# Patient Record
Sex: FEMALE | Race: WHITE | NOT HISPANIC OR LATINO | Employment: STUDENT | URBAN - METROPOLITAN AREA
[De-identification: names, ages, dates, MRNs, and addresses within clinical notes are randomized per-mention and may not be internally consistent; named-entity substitution may affect disease eponyms.]

---

## 2023-08-25 ENCOUNTER — LAB (OUTPATIENT)
Dept: LAB | Facility: LAB | Age: 18
End: 2023-08-25
Payer: COMMERCIAL

## 2023-08-25 ENCOUNTER — OFFICE VISIT (OUTPATIENT)
Dept: PRIMARY CARE | Facility: CLINIC | Age: 18
End: 2023-08-25
Payer: COMMERCIAL

## 2023-08-25 DIAGNOSIS — Z00.00 HEALTH CARE MAINTENANCE: Primary | ICD-10-CM

## 2023-08-25 DIAGNOSIS — Z00.00 HEALTH CARE MAINTENANCE: ICD-10-CM

## 2023-08-25 LAB
ERYTHROCYTE DISTRIBUTION WIDTH (RATIO) BY AUTOMATED COUNT: 12 % (ref 11.5–14.5)
ERYTHROCYTE MEAN CORPUSCULAR HEMOGLOBIN CONCENTRATION (G/DL) BY AUTOMATED: 34.8 G/DL (ref 32–36)
ERYTHROCYTE MEAN CORPUSCULAR VOLUME (FL) BY AUTOMATED COUNT: 98 FL (ref 80–100)
ERYTHROCYTES (10*6/UL) IN BLOOD BY AUTOMATED COUNT: 4.52 X10E12/L (ref 4–5.2)
HEMATOCRIT (%) IN BLOOD BY AUTOMATED COUNT: 44.2 % (ref 36–46)
HEMOGLOBIN (G/DL) IN BLOOD: 15.4 G/DL (ref 12–16)
HEPATITIS B VIRUS SURFACE AB (MIU/ML) IN SERUM: <3.1 MIU/ML
LEUKOCYTES (10*3/UL) IN BLOOD BY AUTOMATED COUNT: 7.1 X10E9/L (ref 4.4–11.3)
NRBC (PER 100 WBCS) BY AUTOMATED COUNT: 0 /100 WBC (ref 0–0)
PLATELETS (10*3/UL) IN BLOOD AUTOMATED COUNT: 356 X10E9/L (ref 150–450)

## 2023-08-25 PROCEDURE — 86765 RUBEOLA ANTIBODY: CPT

## 2023-08-25 PROCEDURE — 86735 MUMPS ANTIBODY: CPT

## 2023-08-25 PROCEDURE — 81003 URINALYSIS AUTO W/O SCOPE: CPT

## 2023-08-25 PROCEDURE — 86481 TB AG RESPONSE T-CELL SUSP: CPT

## 2023-08-25 PROCEDURE — 86787 VARICELLA-ZOSTER ANTIBODY: CPT

## 2023-08-25 PROCEDURE — 90471 IMMUNIZATION ADMIN: CPT | Performed by: INTERNAL MEDICINE

## 2023-08-25 PROCEDURE — 90472 IMMUNIZATION ADMIN EACH ADD: CPT | Performed by: INTERNAL MEDICINE

## 2023-08-25 PROCEDURE — 90682 RIV4 VACC RECOMBINANT DNA IM: CPT | Performed by: INTERNAL MEDICINE

## 2023-08-25 PROCEDURE — 36415 COLL VENOUS BLD VENIPUNCTURE: CPT

## 2023-08-25 PROCEDURE — 99385 PREV VISIT NEW AGE 18-39: CPT | Performed by: INTERNAL MEDICINE

## 2023-08-25 PROCEDURE — 85027 COMPLETE CBC AUTOMATED: CPT

## 2023-08-25 PROCEDURE — 86706 HEP B SURFACE ANTIBODY: CPT

## 2023-08-25 PROCEDURE — 86762 RUBELLA ANTIBODY: CPT

## 2023-08-25 PROCEDURE — 90715 TDAP VACCINE 7 YRS/> IM: CPT | Performed by: INTERNAL MEDICINE

## 2023-08-25 RX ORDER — FLUOXETINE HYDROCHLORIDE 20 MG/1
CAPSULE ORAL
COMMUNITY

## 2023-08-25 RX ORDER — DESOGESTREL AND ETHINYL ESTRADIOL 0.15-0.03
KIT ORAL
COMMUNITY

## 2023-08-25 RX ORDER — TOPIRAMATE 100 MG/1
1 CAPSULE, EXTENDED RELEASE ORAL DAILY
COMMUNITY
Start: 2023-08-11 | End: 2023-11-09

## 2023-08-25 NOTE — PROGRESS NOTES
Subjective   Patient ID: Miroslava Bryant is a 17 y.o. female who presents for New Patient Visit.    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    Physical Exam  Vitals reviewed.   Constitutional:       Appearance: Normal appearance.   HENT:      Head: Normocephalic and atraumatic.      Right Ear: Tympanic membrane, ear canal and external ear normal.      Left Ear: Tympanic membrane, ear canal and external ear normal.      Nose: Nose normal.      Mouth/Throat:      Pharynx: Oropharynx is clear.   Eyes:      Extraocular Movements: Extraocular movements intact.      Conjunctiva/sclera: Conjunctivae normal.      Pupils: Pupils are equal, round, and reactive to light.   Cardiovascular:      Rate and Rhythm: Normal rate and regular rhythm.      Pulses: Normal pulses.      Heart sounds: Normal heart sounds.   Pulmonary:      Effort: Pulmonary effort is normal.      Breath sounds: Normal breath sounds.   Abdominal:      General: Abdomen is flat. Bowel sounds are normal.      Palpations: Abdomen is soft.   Musculoskeletal:      Cervical back: Normal range of motion and neck supple.   Skin:     General: Skin is warm and dry.   Neurological:      General: No focal deficit present.      Mental Status: She is alert and oriented to person, place, and time.   Psychiatric:         Mood and Affect: Mood normal.         Assessment/Plan   Problem List Items Addressed This Visit    None  Visit Diagnoses       Health care maintenance    -  Primary    Relevant Orders    Mumps Antibody, IgG    Rubella Antibody, IgG     Rubeola Antibody, IgG    Varicella Zoster Antibody, IgG    T-Spot TB    Hepatitis B Surface Antibody    CBC    Urinalysis with Reflex Microscopic    Flu vaccine, quadrivalent, recombinant, preservative free, adult (FLUBLOK) (Completed)        Physical normal  Work-up ordered for the immunization titers CBC urinalysis and TB test  Tdap and flu  shot

## 2023-08-26 LAB
APPEARANCE, URINE: NORMAL
BILIRUBIN, URINE: NEGATIVE
BLOOD, URINE: NEGATIVE
COLOR, URINE: YELLOW
GLUCOSE, URINE: NEGATIVE MG/DL
KETONES, URINE: NEGATIVE MG/DL
LEUKOCYTE ESTERASE, URINE: NEGATIVE
MUMPS IGG ANTIBODY: POSITIVE
NITRITE, URINE: NEGATIVE
PH, URINE: 6 (ref 5–8)
PROTEIN, URINE: NEGATIVE MG/DL
RUBELLA VIRUS IGG AB: POSITIVE
RUBEOLA IGG ANTIBODY: POSITIVE
SPECIFIC GRAVITY, URINE: 1.02 (ref 1–1.03)
UROBILINOGEN, URINE: <2 MG/DL (ref 0–1.9)
VARICELLA ZOSTER IGG: POSITIVE

## 2023-08-28 LAB
NIL(NEG) CONTROL SPOT COUNT: NORMAL
PANEL A SPOT COUNT: 0
PANEL B SPOT COUNT: 0
POS CONTROL SPOT COUNT: NORMAL
T-SPOT. TB INTERPRETATION: NEGATIVE

## 2023-08-29 ENCOUNTER — HOSPITAL ENCOUNTER (OUTPATIENT)
Dept: DATA CONVERSION | Facility: HOSPITAL | Age: 18
Discharge: HOME | End: 2023-08-29
Payer: COMMERCIAL

## 2023-08-29 DIAGNOSIS — N89.8 OTHER SPECIFIED NONINFLAMMATORY DISORDERS OF VAGINA: ICD-10-CM

## 2023-08-29 DIAGNOSIS — Z11.3 ENCOUNTER FOR SCREENING FOR INFECTIONS WITH A PREDOMINANTLY SEXUAL MODE OF TRANSMISSION: ICD-10-CM

## 2023-08-29 LAB
C TRACH RRNA SPEC QL NAA+PROBE: NORMAL
DRUG SCREEN COMMENT UR-IMP: NORMAL
N GONORRHOEA RRNA SPEC QL NAA+PROBE: NORMAL
SPECIMEN SOURCE: NORMAL

## 2023-08-31 DIAGNOSIS — Z23 ENCOUNTER FOR IMMUNIZATION: ICD-10-CM

## 2023-08-31 RX ORDER — VARICELLA VIRUS VACCINE LIVE 1350 [PFU]/.5ML
0.5 INJECTION, POWDER, LYOPHILIZED, FOR SUSPENSION SUBCUTANEOUS ONCE
Qty: 1 EACH | Refills: 0 | Status: SHIPPED | OUTPATIENT
Start: 2023-08-31 | End: 2023-08-31

## 2023-09-01 ENCOUNTER — APPOINTMENT (OUTPATIENT)
Dept: PRIMARY CARE | Facility: CLINIC | Age: 18
End: 2023-09-01
Payer: COMMERCIAL

## 2023-09-05 ENCOUNTER — LAB (OUTPATIENT)
Dept: LAB | Facility: LAB | Age: 18
End: 2023-09-05
Payer: COMMERCIAL

## 2023-09-05 DIAGNOSIS — Z23 ENCOUNTER FOR IMMUNIZATION: ICD-10-CM

## 2023-09-05 LAB — VARICELLA ZOSTER IGG: NORMAL

## 2023-09-05 PROCEDURE — 86787 VARICELLA-ZOSTER ANTIBODY: CPT

## 2023-09-05 PROCEDURE — 36415 COLL VENOUS BLD VENIPUNCTURE: CPT

## 2023-09-13 DIAGNOSIS — Z23 ENCOUNTER FOR IMMUNIZATION: ICD-10-CM

## 2023-09-16 VITALS
SYSTOLIC BLOOD PRESSURE: 112 MMHG | WEIGHT: 122.2 LBS | BODY MASS INDEX: 22.49 KG/M2 | DIASTOLIC BLOOD PRESSURE: 64 MMHG | HEIGHT: 62 IN

## 2023-10-16 ENCOUNTER — LAB (OUTPATIENT)
Dept: LAB | Facility: LAB | Age: 18
End: 2023-10-16
Payer: COMMERCIAL

## 2023-10-16 DIAGNOSIS — Z23 ENCOUNTER FOR IMMUNIZATION: ICD-10-CM

## 2023-10-16 LAB
VARICELLA ZOSTER IGG INDEX: 4.7 IA
VZV IGG SER QL IA: POSITIVE

## 2023-10-16 PROCEDURE — 36415 COLL VENOUS BLD VENIPUNCTURE: CPT

## 2023-10-16 PROCEDURE — 86787 VARICELLA-ZOSTER ANTIBODY: CPT

## 2024-02-14 ENCOUNTER — OFFICE VISIT (OUTPATIENT)
Dept: PRIMARY CARE | Facility: CLINIC | Age: 19
End: 2024-02-14
Payer: COMMERCIAL

## 2024-02-14 VITALS
DIASTOLIC BLOOD PRESSURE: 54 MMHG | BODY MASS INDEX: 21.62 KG/M2 | HEIGHT: 63 IN | WEIGHT: 122 LBS | SYSTOLIC BLOOD PRESSURE: 102 MMHG

## 2024-02-14 DIAGNOSIS — R35.0 URINARY FREQUENCY: ICD-10-CM

## 2024-02-14 DIAGNOSIS — M54.50 ACUTE MIDLINE LOW BACK PAIN WITHOUT SCIATICA: ICD-10-CM

## 2024-02-14 LAB
POC APPEARANCE, URINE: CLEAR
POC BILIRUBIN, URINE: NEGATIVE
POC BLOOD, URINE: NEGATIVE
POC COLOR, URINE: YELLOW
POC GLUCOSE, URINE: NEGATIVE MG/DL
POC KETONES, URINE: NEGATIVE MG/DL
POC LEUKOCYTES, URINE: NEGATIVE
POC NITRITE,URINE: NEGATIVE
POC PH, URINE: 6 PH
POC PROTEIN, URINE: NEGATIVE MG/DL
POC SPECIFIC GRAVITY, URINE: >=1.03
POC UROBILINOGEN, URINE: 0.2 EU/DL

## 2024-02-14 PROCEDURE — 99213 OFFICE O/P EST LOW 20 MIN: CPT | Performed by: INTERNAL MEDICINE

## 2024-02-14 PROCEDURE — 81003 URINALYSIS AUTO W/O SCOPE: CPT | Performed by: INTERNAL MEDICINE

## 2024-02-14 PROCEDURE — 1036F TOBACCO NON-USER: CPT | Performed by: INTERNAL MEDICINE

## 2024-02-14 RX ORDER — CYCLOBENZAPRINE HCL 10 MG
10 TABLET ORAL 3 TIMES DAILY PRN
Qty: 60 TABLET | Refills: 0 | Status: SHIPPED | OUTPATIENT
Start: 2024-02-14 | End: 2024-04-14

## 2024-02-14 RX ORDER — NABUMETONE 750 MG/1
750 TABLET, FILM COATED ORAL 2 TIMES DAILY
Qty: 60 TABLET | Refills: 0 | Status: SHIPPED | OUTPATIENT
Start: 2024-02-14 | End: 2025-02-13

## 2024-02-19 NOTE — PROGRESS NOTES
"Subjective   Patient ID: Miroslava Bryant is a 18 y.o. female who presents for lower back pain.    HPI   Patient presents with complaints of back pain she is complaining of pain across concerned that she is having UTI or kidney stone.  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   Constitutional:       Appearance: Normal appearance.   HENT:      Head: Normocephalic and atraumatic.      Right Ear: Tympanic membrane, ear canal and external ear normal.      Left Ear: Tympanic membrane, ear canal and external ear normal.      Nose: Nose normal.      Mouth/Throat:      Pharynx: Oropharynx is clear.   Eyes:      Extraocular Movements: Extraocular movements intact.      Conjunctiva/sclera: Conjunctivae normal.      Pupils: Pupils are equal, round, and reactive to light.   Cardiovascular:      Rate and Rhythm: Normal rate and regular rhythm.      Pulses: Normal pulses.      Heart sounds: Normal heart sounds.   Pulmonary:      Effort: Pulmonary effort is normal.      Breath sounds: Normal breath sounds.   Abdominal:      General: Abdomen is flat. Bowel sounds are normal.      Palpations: Abdomen is soft.   Musculoskeletal:         General: Tenderness present.      Cervical back: Normal range of motion and neck supple.      Comments: Tenderness in lumbar spine across   Skin:     General: Skin is warm and dry.   Neurological:      General: No focal deficit present.      Mental Status: " She is alert and oriented to person, place, and time.   Psychiatric:         Mood and Affect: Mood normal.         Assessment/Plan   Problem List Items Addressed This Visit    None  Visit Diagnoses       Urinary frequency        Relevant Orders    POCT UA Automated manually resulted (Completed)    Acute midline low back pain without sciatica        Relevant Medications    nabumetone (Relafen) 750 mg tablet    cyclobenzaprine (Flexeril) 10 mg tablet        Past recap   physical normal  Work-up ordered for the immunization titers CBC urinalysis and TB test  Tdap and flu  shot    14 2024  UA done  No UTI  No blood in the urine  Patient has pain on both sides and it is in the lower lumbar area  Most likely is muscular  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

## 2024-05-21 ENCOUNTER — TELEPHONE (OUTPATIENT)
Dept: OBSTETRICS AND GYNECOLOGY | Facility: CLINIC | Age: 19
End: 2024-05-21
Payer: COMMERCIAL

## 2024-05-21 DIAGNOSIS — B37.9 YEAST INFECTION: Primary | ICD-10-CM

## 2024-05-21 RX ORDER — FLUCONAZOLE 150 MG/1
150 TABLET ORAL ONCE
Qty: 1 TABLET | Refills: 0 | Status: SHIPPED | OUTPATIENT
Start: 2024-05-21 | End: 2024-05-21

## 2024-05-21 NOTE — TELEPHONE ENCOUNTER
Pt on amoxicillin for an ear infection and now with a yeast infection, mom calling and requesting diflucan.  Pt c/o itching and white discharge.  Mom states pharmacy having trouble retrieving voice mails, please escribe

## 2024-06-13 DIAGNOSIS — Z30.09 ENCOUNTER FOR OTHER GENERAL COUNSELING AND ADVICE ON CONTRACEPTION: ICD-10-CM

## 2024-06-13 RX ORDER — NORELGESTROMIN AND ETHINYL ESTRADIOL 150; 35 UG/D; UG/D
PATCH TRANSDERMAL
Qty: 9 PATCH | Refills: 3 | Status: SHIPPED | OUTPATIENT
Start: 2024-06-13

## 2024-07-01 ENCOUNTER — OFFICE VISIT (OUTPATIENT)
Dept: PRIMARY CARE | Facility: CLINIC | Age: 19
End: 2024-07-01
Payer: COMMERCIAL

## 2024-07-01 ENCOUNTER — LAB (OUTPATIENT)
Dept: LAB | Facility: LAB | Age: 19
End: 2024-07-01
Payer: COMMERCIAL

## 2024-07-01 DIAGNOSIS — R53.83 OTHER FATIGUE: ICD-10-CM

## 2024-07-01 DIAGNOSIS — T78.40XD ALLERGY, SUBSEQUENT ENCOUNTER: ICD-10-CM

## 2024-07-01 DIAGNOSIS — F41.9 ANXIETY: Primary | ICD-10-CM

## 2024-07-01 DIAGNOSIS — R07.9 CHEST PAIN, UNSPECIFIED TYPE: ICD-10-CM

## 2024-07-01 LAB
ALBUMIN SERPL BCP-MCNC: 4 G/DL (ref 3.4–5)
ALP SERPL-CCNC: 42 U/L (ref 33–110)
ALT SERPL W P-5'-P-CCNC: 13 U/L (ref 7–45)
ANION GAP SERPL CALC-SCNC: 14 MMOL/L (ref 10–20)
APPEARANCE UR: CLEAR
AST SERPL W P-5'-P-CCNC: 13 U/L (ref 9–39)
BILIRUB SERPL-MCNC: 0.2 MG/DL (ref 0–1.2)
BILIRUB UR STRIP.AUTO-MCNC: NEGATIVE MG/DL
BUN SERPL-MCNC: 11 MG/DL (ref 6–23)
CALCIUM SERPL-MCNC: 9.2 MG/DL (ref 8.6–10.6)
CHLORIDE SERPL-SCNC: 108 MMOL/L (ref 98–107)
CO2 SERPL-SCNC: 22 MMOL/L (ref 21–32)
COLOR UR: NORMAL
CREAT SERPL-MCNC: 0.65 MG/DL (ref 0.5–1.05)
EGFRCR SERPLBLD CKD-EPI 2021: >90 ML/MIN/1.73M*2
ERYTHROCYTE [DISTWIDTH] IN BLOOD BY AUTOMATED COUNT: 12.2 % (ref 11.5–14.5)
GLUCOSE SERPL-MCNC: 105 MG/DL (ref 74–99)
GLUCOSE UR STRIP.AUTO-MCNC: NORMAL MG/DL
HCT VFR BLD AUTO: 41.5 % (ref 36–46)
HGB BLD-MCNC: 14.1 G/DL (ref 12–16)
KETONES UR STRIP.AUTO-MCNC: NEGATIVE MG/DL
LEUKOCYTE ESTERASE UR QL STRIP.AUTO: NEGATIVE
MCH RBC QN AUTO: 32.3 PG (ref 26–34)
MCHC RBC AUTO-ENTMCNC: 34 G/DL (ref 32–36)
MCV RBC AUTO: 95 FL (ref 80–100)
NITRITE UR QL STRIP.AUTO: NEGATIVE
NRBC BLD-RTO: 0 /100 WBCS (ref 0–0)
PH UR STRIP.AUTO: 7 [PH]
PLATELET # BLD AUTO: 383 X10*3/UL (ref 150–450)
POTASSIUM SERPL-SCNC: 3.9 MMOL/L (ref 3.5–5.3)
PROT SERPL-MCNC: 6.9 G/DL (ref 6.4–8.2)
PROT UR STRIP.AUTO-MCNC: NEGATIVE MG/DL
RBC # BLD AUTO: 4.37 X10*6/UL (ref 4–5.2)
RBC # UR STRIP.AUTO: NEGATIVE /UL
SODIUM SERPL-SCNC: 140 MMOL/L (ref 136–145)
SP GR UR STRIP.AUTO: 1.02
T3FREE SERPL-MCNC: 3.2 PG/ML (ref 3–4.7)
T4 FREE SERPL-MCNC: 1.04 NG/DL (ref 0.78–1.48)
TSH SERPL-ACNC: 1.12 MIU/L (ref 0.44–3.98)
UROBILINOGEN UR STRIP.AUTO-MCNC: NORMAL MG/DL
WBC # BLD AUTO: 8.8 X10*3/UL (ref 4.4–11.3)

## 2024-07-01 PROCEDURE — 84443 ASSAY THYROID STIM HORMONE: CPT

## 2024-07-01 PROCEDURE — 80053 COMPREHEN METABOLIC PANEL: CPT

## 2024-07-01 PROCEDURE — 81003 URINALYSIS AUTO W/O SCOPE: CPT

## 2024-07-01 PROCEDURE — 84439 ASSAY OF FREE THYROXINE: CPT

## 2024-07-01 PROCEDURE — 93000 ELECTROCARDIOGRAM COMPLETE: CPT | Performed by: INTERNAL MEDICINE

## 2024-07-01 PROCEDURE — 84481 FREE ASSAY (FT-3): CPT

## 2024-07-01 PROCEDURE — 36415 COLL VENOUS BLD VENIPUNCTURE: CPT

## 2024-07-01 PROCEDURE — 99214 OFFICE O/P EST MOD 30 MIN: CPT | Performed by: INTERNAL MEDICINE

## 2024-07-01 PROCEDURE — 85027 COMPLETE CBC AUTOMATED: CPT

## 2024-07-01 RX ORDER — OFLOXACIN 300 MG/1
300 TABLET, COATED ORAL 2 TIMES DAILY
COMMUNITY

## 2024-07-01 ASSESSMENT — ENCOUNTER SYMPTOMS
LOSS OF SENSATION IN FEET: 0
OCCASIONAL FEELINGS OF UNSTEADINESS: 0
DEPRESSION: 0

## 2024-07-02 ENCOUNTER — HOSPITAL ENCOUNTER (OUTPATIENT)
Dept: CARDIOLOGY | Facility: CLINIC | Age: 19
Discharge: HOME | End: 2024-07-02
Payer: COMMERCIAL

## 2024-07-02 DIAGNOSIS — R07.9 CHEST PAIN, UNSPECIFIED TYPE: ICD-10-CM

## 2024-07-02 NOTE — PROGRESS NOTES
Subjective   Patient ID: Miroslava Bryant is a 18 y.o. female who presents for multiple medical issues.    Ms. Manny Colorado today came here for multiple medical issues.  She is not feeling well.  Weak, tired, fatigued, and exhausted.  She has palpitations, not feeling good.  On and off it hurts.  She is worried about it.  No nausea or vomiting.  It happens on and off.    I have personally reviewed the patient's Past Medical History, Medications, Allergies, Social History, and Family History in the EMR.    Review of Systems   All other systems reviewed and are negative.    Objective   There were no vitals taken for this visit.    Physical Exam  Vitals reviewed.   Cardiovascular:      Heart sounds: Normal heart sounds, S1 normal and S2 normal. No murmur heard.     No friction rub.   Pulmonary:      Effort: Pulmonary effort is normal.      Breath sounds: Normal breath sounds and air entry.   Abdominal:      Palpations: There is no hepatomegaly, splenomegaly or mass.   Musculoskeletal:      Right lower leg: No edema.      Left lower leg: No edema.   Lymphadenopathy:      Lower Body: No right inguinal adenopathy. No left inguinal adenopathy.   Neurological:      Cranial Nerves: Cranial nerves 2-12 are intact.      Sensory: No sensory deficit.      Motor: Motor function is intact.      Deep Tendon Reflexes: Reflexes are normal and symmetric.     LAB WORK:  Laboratory testing discussed.    Assessment/Plan   Problem List Items Addressed This Visit    None  Visit Diagnoses         Codes    Anxiety    -  Primary F41.9    Chest pain, unspecified type     R07.9    Relevant Orders    Holter or Event Cardiac Monitor    Other fatigue     R53.83    Relevant Orders    CBC (Completed)    Comprehensive Metabolic Panel (Completed)    Urinalysis with Reflex Microscopic (Completed)    Thyroid Stimulating Hormone (Completed)    Thyroxine, Free (Completed)    Triiodothyronine, Free (Completed)    Allergy, subsequent encounter     T78.40XD         1. Chest pain and palpitations.  24-hour Holter and echo done.  Blood work and thyroid ordered.  2. Anxiety and depression, okay.  She takes Prozac.  3. Allergies.  Monitor.  4. Follow-up appointment with me in a week after the test.  So she can proceed.    Scribe Attestation  By signing my name below, IDesiree, Scrkatlyn attest that this documentation has been prepared under the direction and in the presence of Nikki Marti MD.

## 2024-07-15 DIAGNOSIS — F41.9 ANXIETY: ICD-10-CM

## 2024-07-15 RX ORDER — FLUOXETINE HYDROCHLORIDE 20 MG/1
20 CAPSULE ORAL DAILY
Qty: 30 CAPSULE | Refills: 0 | Status: SHIPPED | OUTPATIENT
Start: 2024-07-15 | End: 2024-07-25 | Stop reason: SDUPTHER

## 2024-07-25 ENCOUNTER — OFFICE VISIT (OUTPATIENT)
Dept: PRIMARY CARE | Facility: CLINIC | Age: 19
End: 2024-07-25
Payer: COMMERCIAL

## 2024-07-25 VITALS
DIASTOLIC BLOOD PRESSURE: 60 MMHG | BODY MASS INDEX: 22.15 KG/M2 | HEIGHT: 63 IN | SYSTOLIC BLOOD PRESSURE: 114 MMHG | WEIGHT: 125 LBS

## 2024-07-25 DIAGNOSIS — F41.9 ANXIETY: ICD-10-CM

## 2024-07-25 DIAGNOSIS — R00.2 HEART PALPITATIONS: ICD-10-CM

## 2024-07-25 PROBLEM — Z20.822 CONTACT WITH AND (SUSPECTED) EXPOSURE TO COVID-19: Status: ACTIVE | Noted: 2021-11-15

## 2024-07-25 PROBLEM — R22.40 MASS OF FOOT: Status: ACTIVE | Noted: 2024-07-25

## 2024-07-25 PROBLEM — G43.919 REFRACTORY MIGRAINE: Status: ACTIVE | Noted: 2024-07-25

## 2024-07-25 PROBLEM — N94.9 DISORDER OF FEMALE GENITAL ORGAN: Status: ACTIVE | Noted: 2024-07-25

## 2024-07-25 PROBLEM — R35.0 INCREASED FREQUENCY OF URINATION: Status: ACTIVE | Noted: 2024-07-25

## 2024-07-25 PROBLEM — R07.81 RIB PAIN: Status: ACTIVE | Noted: 2024-07-25

## 2024-07-25 PROCEDURE — 1036F TOBACCO NON-USER: CPT | Performed by: INTERNAL MEDICINE

## 2024-07-25 PROCEDURE — 99214 OFFICE O/P EST MOD 30 MIN: CPT | Performed by: INTERNAL MEDICINE

## 2024-07-25 PROCEDURE — 3008F BODY MASS INDEX DOCD: CPT | Performed by: INTERNAL MEDICINE

## 2024-07-25 RX ORDER — FLUOXETINE HYDROCHLORIDE 20 MG/1
20 CAPSULE ORAL DAILY
Qty: 90 CAPSULE | Refills: 1 | Status: SHIPPED | OUTPATIENT
Start: 2024-07-25

## 2024-07-25 NOTE — PROGRESS NOTES
"Subjective   Patient ID: Miroslava Bryant is a 18 y.o. female who presents for Med Refill and Results.    HPI   Patient is here for follow-up on Holter monitor  Needs refill on Prozac.  On Prozac for anxiety and depression through his psychiatrist  4 cans of diet Coke per day does not drink much water  Gets very dizzy and lightheaded with palpitation    Past recap  Patient presents with complaints of back pain she is complaining of pain across concerned that she is having UTI or kidney stone.  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   Constitutional:       Appearance: Normal appearance.   HENT:      Head: Normocephalic and atraumatic.      Right Ear: Tympanic membrane, ear canal and external ear normal.      Left Ear: Tympanic membrane, ear canal and external ear normal.      Nose: Nose normal.      Mouth/Throat:      Pharynx: Oropharynx is clear.   Eyes:      Extraocular Movements: Extraocular movements intact.      Conjunctiva/sclera: Conjunctivae normal.      Pupils: Pupils are equal, round, and reactive to light.   Cardiovascular:      Rate and Rhythm: Normal rate and regular rhythm.      Pulses: Normal pulses.      Heart sounds: Normal heart sounds.   Pulmonary:      Effort: Pulmonary effort is normal.      Breath sounds: Normal breath sounds.   Abdominal:      General: Abdomen is flat. Bowel sounds are normal.      Palpations: Abdomen is soft.   Musculoskeletal:    "   Cervical back: Normal range of motion and neck supple.   Skin:     General: Skin is warm and dry.   Neurological:      General: No focal deficit present.      Mental Status: She is alert and oriented to person, place, and time.   Psychiatric:         Mood and Affect: Mood normal.         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 tach  Advised patient to increase water intake cut down all the caffeinated products  Refer to cardiology  Patient is doing well on Prozac wants to stay on next visit  Refills given for 6 months

## 2024-08-07 ENCOUNTER — APPOINTMENT (OUTPATIENT)
Dept: CARDIOLOGY | Facility: CLINIC | Age: 19
End: 2024-08-07
Payer: COMMERCIAL

## 2024-08-07 VITALS
DIASTOLIC BLOOD PRESSURE: 52 MMHG | HEART RATE: 88 BPM | RESPIRATION RATE: 14 BRPM | OXYGEN SATURATION: 98 % | BODY MASS INDEX: 24.29 KG/M2 | WEIGHT: 132 LBS | TEMPERATURE: 98 F | SYSTOLIC BLOOD PRESSURE: 98 MMHG | HEIGHT: 62 IN

## 2024-08-07 DIAGNOSIS — R07.2 PRECORDIAL PAIN: ICD-10-CM

## 2024-08-07 DIAGNOSIS — R00.2 PALPITATIONS: Primary | ICD-10-CM

## 2024-08-07 DIAGNOSIS — R42 DIZZINESS: ICD-10-CM

## 2024-08-07 PROBLEM — R07.9 CHEST PAIN: Status: ACTIVE | Noted: 2024-08-07

## 2024-08-07 PROCEDURE — 1036F TOBACCO NON-USER: CPT | Performed by: INTERNAL MEDICINE

## 2024-08-07 PROCEDURE — 3008F BODY MASS INDEX DOCD: CPT | Performed by: INTERNAL MEDICINE

## 2024-08-07 PROCEDURE — 99203 OFFICE O/P NEW LOW 30 MIN: CPT | Performed by: INTERNAL MEDICINE

## 2024-08-07 RX ORDER — METOPROLOL SUCCINATE 25 MG/1
25 TABLET, EXTENDED RELEASE ORAL DAILY
Qty: 30 TABLET | Refills: 5 | Status: SHIPPED | OUTPATIENT
Start: 2024-08-07 | End: 2025-02-03

## 2024-08-07 ASSESSMENT — LIFESTYLE VARIABLES
HAVE YOU OR SOMEONE ELSE BEEN INJURED AS A RESULT OF YOUR DRINKING: NO
AUDIT TOTAL SCORE: 0
SKIP TO QUESTIONS 9-10: 1
AUDIT-C TOTAL SCORE: 0
HOW MANY STANDARD DRINKS CONTAINING ALCOHOL DO YOU HAVE ON A TYPICAL DAY: PATIENT DOES NOT DRINK
HOW OFTEN DO YOU HAVE SIX OR MORE DRINKS ON ONE OCCASION: NEVER
HOW OFTEN DO YOU HAVE A DRINK CONTAINING ALCOHOL: NEVER
HAS A RELATIVE, FRIEND, DOCTOR, OR ANOTHER HEALTH PROFESSIONAL EXPRESSED CONCERN ABOUT YOUR DRINKING OR SUGGESTED YOU CUT DOWN: NO

## 2024-08-07 ASSESSMENT — PAIN SCALES - GENERAL: PAINLEVEL: 0-NO PAIN

## 2024-08-07 ASSESSMENT — PATIENT HEALTH QUESTIONNAIRE - PHQ9
2. FEELING DOWN, DEPRESSED OR HOPELESS: NOT AT ALL
SUM OF ALL RESPONSES TO PHQ9 QUESTIONS 1 AND 2: 0
1. LITTLE INTEREST OR PLEASURE IN DOING THINGS: NOT AT ALL

## 2024-08-07 ASSESSMENT — ENCOUNTER SYMPTOMS
OCCASIONAL FEELINGS OF UNSTEADINESS: 0
DEPRESSION: 0
LOSS OF SENSATION IN FEET: 0

## 2024-08-07 NOTE — PROGRESS NOTES
Consulting Physician:  Gaby Marti MD     Reason for the consult:  Dizziness, palpitations    History of present illness:  This is a very pleasant 18-year-old female referred to my office for evaluation of palpitations and dizziness.  Patient has been having the symptoms for the last 2 months where she feels dizzy lightheaded.  Happening when she sits or stands or even laying in bed.  Palpitations with fast heart rate makes her dizzy.  Denies having shortness of breath with activity.  Occasionally she has chest pain with activity with deep inspiration.  Underwent a monitor for 2 weeks that showed 1 episode of 4 beats of nonsustained ventricular tachycardia.  Otherwise no major arrhythmias except for premature ventricular contractions.      Past Medical History:   Diagnosis Date    Other conditions influencing health status     No significant past medical history       History reviewed. No pertinent surgical history.    No Known Allergies     reports that she has never smoked. She has never been exposed to tobacco smoke. She has never used smokeless tobacco. She reports that she does not drink alcohol and does not use drugs.    No family history on file.    Patient's Medications   New Prescriptions    No medications on file   Previous Medications    CYCLOBENZAPRINE (FLEXERIL) 10 MG TABLET    Take 1 tablet (10 mg) by mouth 3 times a day as needed for muscle spasms.    DESOGESTREL-ETHINYL ESTRADIOL (APRI) 0.15-0.03 MG TABLET    TAKE 1 TABLET BY MOUTH EVERY DAY CONTINUOUS, TAKE PLACEBO WEEK EVERY 3 MONTHSS ORALLY 90 DAY(S) 84 for 84    FLUOXETINE (PROZAC) 20 MG CAPSULE    Take 1 capsule (20 mg) by mouth once daily.    NABUMETONE (RELAFEN) 750 MG TABLET    Take 1 tablet (750 mg) by mouth 2 times a day.    OFLOXACIN (FLOXIN) 300 MG TABLET    Take 1 tablet (300 mg) by mouth 2 times a day.    TOPIRAMATE 100 MG CAPSULE,EXTENDED RELEASE 24HR    Take 1 capsule by mouth once daily.    XULANE 150-35 MCG/24 HR    APPLY PATCH TO  SKIN, REPLACE PATCH ONCE WEEKLY FOR 3 WEEKS, REMOVE PATCH ON WEEK 4 TRANSDERMAL AS DIRECTED 90 DAYS   Modified Medications    No medications on file   Discontinued Medications    No medications on file         Review of Systems  10 point review of systems otherwise negative     Objective   Physical Exam  General: Patient in no acute distress   HEENT: Atraumatic normocephalic.  Neck: Supple, jugular venous pressure within normal limit.  No bruits  Lungs: Clear to auscultation bilaterally  Cardiovascular: Regular rate and rhythm, normal heart sounds, no murmurs rubs or gallops  Abdomen: Soft nontender nondistended.  Normal bowel sounds.  Extremities: Warm to touch, no edema.  Neurologic examination: Patient is awake alert oriented x3.  Psychiatric examination: Patient denies being depressed or suicidal.  Good insight  Vascular examination: Pulses intact bilaterally     Lab Review   Lab on 07/01/2024   Component Date Value    WBC 07/01/2024 8.8     nRBC 07/01/2024 0.0     RBC 07/01/2024 4.37     Hemoglobin 07/01/2024 14.1     Hematocrit 07/01/2024 41.5     MCV 07/01/2024 95     MCH 07/01/2024 32.3     MCHC 07/01/2024 34.0     RDW 07/01/2024 12.2     Platelets 07/01/2024 383     Glucose 07/01/2024 105 (H)     Sodium 07/01/2024 140     Potassium 07/01/2024 3.9     Chloride 07/01/2024 108 (H)     Bicarbonate 07/01/2024 22     Anion Gap 07/01/2024 14     Urea Nitrogen 07/01/2024 11     Creatinine 07/01/2024 0.65     eGFR 07/01/2024 >90     Calcium 07/01/2024 9.2     Albumin 07/01/2024 4.0     Alkaline Phosphatase 07/01/2024 42     Total Protein 07/01/2024 6.9     AST 07/01/2024 13     Bilirubin, Total 07/01/2024 0.2     ALT 07/01/2024 13     Color, Urine 07/01/2024 Light-Yellow     Appearance, Urine 07/01/2024 Clear     Specific Gravity, Urine 07/01/2024 1.016     pH, Urine 07/01/2024 7.0     Protein, Urine 07/01/2024 NEGATIVE     Glucose, Urine 07/01/2024 Normal     Blood, Urine 07/01/2024 NEGATIVE     Ketones, Urine  07/01/2024 NEGATIVE     Bilirubin, Urine 07/01/2024 NEGATIVE     Urobilinogen, Urine 07/01/2024 Normal     Nitrite, Urine 07/01/2024 NEGATIVE     Leukocyte Esterase, Urine 07/01/2024 NEGATIVE     Thyroid Stimulating Horm* 07/01/2024 1.12     Thyroxine, Free 07/01/2024 1.04     Triiodothyronine, Free 07/01/2024 3.2         Assessment/Plan    This is a very pleasant 18-year-old female referred to my office for evaluation of palpitations and dizziness.  Patient has been having the symptoms for the last 2 months where she feels dizzy lightheaded.  Happening when she sits or stands or even laying in bed.  Palpitations with fast heart rate makes her dizzy.  Denies having shortness of breath with activity.  Occasionally she has chest pain with activity with deep inspiration.  Underwent a monitor for 2 weeks that showed 1 episode of 4 beats of nonsustained ventricular tachycardia.  Otherwise no major arrhythmias except for premature ventricular contractions.  Physical examination unremarkable.  At this point my recommendation is to start small dose beta-blockers 25 mg of metoprolol succinate today.  If patient develops dizziness or lightheadedness with the metoprolol then to stop.  We will arrange for echocardiogram to assess ejection fraction.  Rule out any structural heart disease.  Will follow-up in 6 months.  If patient has worsening symptoms with beta-blockers we will stop it.  Discussed with her at length to keep me posted about her symptoms    Jaquelin Obrien MD

## 2024-08-22 ENCOUNTER — APPOINTMENT (OUTPATIENT)
Dept: CARDIOLOGY | Facility: HOSPITAL | Age: 19
End: 2024-08-22
Payer: COMMERCIAL

## 2024-08-29 ENCOUNTER — HOSPITAL ENCOUNTER (OUTPATIENT)
Dept: CARDIOLOGY | Facility: CLINIC | Age: 19
Discharge: HOME | End: 2024-08-29
Payer: COMMERCIAL

## 2024-08-29 DIAGNOSIS — R00.2 PALPITATIONS: ICD-10-CM

## 2024-08-29 LAB
AORTIC VALVE PEAK VELOCITY: 1.21 M/S
AV PEAK GRADIENT: 5.9 MMHG
AVA (PEAK VEL): 2.74 CM2
EJECTION FRACTION APICAL 4 CHAMBER: 68.4
EJECTION FRACTION: 63 %
LEFT ATRIUM VOLUME AREA LENGTH INDEX BSA: 26.2 ML/M2
LEFT VENTRICLE INTERNAL DIMENSION DIASTOLE: 4.68 CM (ref 3.5–6)
LEFT VENTRICULAR OUTFLOW TRACT DIAMETER: 1.97 CM
MITRAL VALVE E/A RATIO: 1.56
RIGHT VENTRICLE FREE WALL PEAK S': 11 CM/S
TRICUSPID ANNULAR PLANE SYSTOLIC EXCURSION: 2.4 CM

## 2024-08-29 PROCEDURE — 93306 TTE W/DOPPLER COMPLETE: CPT | Performed by: INTERNAL MEDICINE

## 2024-08-29 PROCEDURE — 93306 TTE W/DOPPLER COMPLETE: CPT

## 2024-09-18 ENCOUNTER — TELEPHONE (OUTPATIENT)
Dept: CARDIOLOGY | Facility: CLINIC | Age: 19
End: 2024-09-18
Payer: COMMERCIAL

## 2024-09-18 NOTE — TELEPHONE ENCOUNTER
----- Message from Jaquelin Obrien sent at 9/18/2024  1:06 PM EDT -----  Tell patient that her echo was overall okay.  Let me see her as scheduled  ----- Message -----  From: [facility] - Cardiology Results In  Sent: 8/29/2024   6:18 PM EDT  To: Jaquelin Obrien MD

## 2024-10-07 NOTE — TELEPHONE ENCOUNTER
Miroslava Bryant  was called her results from echo and was given the result of normal      Miroslava Bryant was told to come as scheduled

## 2024-11-23 ENCOUNTER — APPOINTMENT (OUTPATIENT)
Dept: PRIMARY CARE | Facility: CLINIC | Age: 19
End: 2024-11-23
Payer: COMMERCIAL

## 2024-11-23 VITALS
BODY MASS INDEX: 25.76 KG/M2 | HEIGHT: 62 IN | SYSTOLIC BLOOD PRESSURE: 102 MMHG | WEIGHT: 140 LBS | DIASTOLIC BLOOD PRESSURE: 56 MMHG

## 2024-11-23 DIAGNOSIS — Z00.00 PHYSICAL EXAM: ICD-10-CM

## 2024-11-23 NOTE — PROGRESS NOTES
"Subjective   Patient ID: Miroslava Bryant is a 18 y.o. female who presents for cpe.    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  On Prozac for anxiety and depression through his psychiatrist  4 cans of diet Coke per day does not drink much water  Gets very dizzy and lightheaded with palpitation    Patient presents with complaints of back pain she is complaining of pain across concerned that she is having UTI or kidney stone.  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   Constitutional:       Appearance: Normal appearance.   HENT:      Head: Normocephalic and atraumatic.      Right Ear: Tympanic membrane, ear canal and external ear normal.      Left Ear: Tympanic membrane, ear canal and external ear normal.      Nose: Nose normal.      Mouth/Throat:      Pharynx: Oropharynx is clear.   Eyes:      Extraocular Movements: Extraocular movements intact.      Conjunctiva/sclera: Conjunctivae normal.      Pupils: Pupils are equal, round, and reactive to light.   Cardiovascular:      Rate and Rhythm: Normal rate and regular rhythm.      Pulses: Normal pulses.      Heart sounds: Normal heart sounds.   Pulmonary:      Effort: Pulmonary effort is normal.      Breath sounds: Normal breath sounds.   Abdominal:      " General: Abdomen is flat. Bowel sounds are normal.      Palpations: Abdomen is soft.   Musculoskeletal:      Cervical back: Normal range of motion and neck supple.   Skin:     General: Skin is warm and dry.   Neurological:      General: No focal deficit present.      Mental Status: She is alert and oriented to person, place, and time.   Psychiatric:         Mood and Affect: Mood normal.         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 tach  Advised patient to increase water intake cut down all the caffeinated products  Refer to cardiology  Patient is doing well on Prozac wants to stay on next visit  Refills given for 6 months    11/23/2024  Physical normal  Routine blood work CMP fasting lipid ordered  Encouraged to cut down caffeinated drinks  TB test ordered  Forms filled

## 2024-11-25 ENCOUNTER — LAB (OUTPATIENT)
Dept: LAB | Facility: LAB | Age: 19
End: 2024-11-25
Payer: COMMERCIAL

## 2024-11-25 DIAGNOSIS — Z00.00 PHYSICAL EXAM: ICD-10-CM

## 2024-11-25 LAB
ALBUMIN SERPL BCP-MCNC: 4.1 G/DL (ref 3.4–5)
ALP SERPL-CCNC: 48 U/L (ref 33–110)
ALT SERPL W P-5'-P-CCNC: 24 U/L (ref 7–45)
ANION GAP SERPL CALC-SCNC: 15 MMOL/L (ref 10–20)
AST SERPL W P-5'-P-CCNC: 16 U/L (ref 9–39)
BILIRUB SERPL-MCNC: 0.4 MG/DL (ref 0–1.2)
BUN SERPL-MCNC: 11 MG/DL (ref 6–23)
CALCIUM SERPL-MCNC: 9 MG/DL (ref 8.6–10.6)
CHLORIDE SERPL-SCNC: 108 MMOL/L (ref 98–107)
CHOLEST SERPL-MCNC: 204 MG/DL (ref 0–199)
CHOLESTEROL/HDL RATIO: 3.1
CO2 SERPL-SCNC: 22 MMOL/L (ref 21–32)
CREAT SERPL-MCNC: 0.74 MG/DL (ref 0.5–1.05)
EGFRCR SERPLBLD CKD-EPI 2021: >90 ML/MIN/1.73M*2
GLUCOSE SERPL-MCNC: 67 MG/DL (ref 74–99)
HDLC SERPL-MCNC: 66.8 MG/DL
LDLC SERPL CALC-MCNC: 113 MG/DL
NON HDL CHOLESTEROL: 137 MG/DL (ref 0–119)
POTASSIUM SERPL-SCNC: 4.2 MMOL/L (ref 3.5–5.3)
PROT SERPL-MCNC: 6.8 G/DL (ref 6.4–8.2)
SODIUM SERPL-SCNC: 141 MMOL/L (ref 136–145)
TRIGL SERPL-MCNC: 121 MG/DL (ref 0–114)
VLDL: 24 MG/DL (ref 0–40)

## 2024-11-25 PROCEDURE — 80061 LIPID PANEL: CPT

## 2024-11-25 PROCEDURE — 36415 COLL VENOUS BLD VENIPUNCTURE: CPT

## 2024-11-25 PROCEDURE — 86481 TB AG RESPONSE T-CELL SUSP: CPT

## 2024-11-25 PROCEDURE — 80053 COMPREHEN METABOLIC PANEL: CPT

## 2024-11-26 DIAGNOSIS — F41.9 ANXIETY: ICD-10-CM

## 2024-11-29 RX ORDER — FLUOXETINE HYDROCHLORIDE 20 MG/1
20 CAPSULE ORAL DAILY
Qty: 90 CAPSULE | Refills: 0 | Status: SHIPPED | OUTPATIENT
Start: 2024-11-29

## 2025-02-04 ENCOUNTER — APPOINTMENT (OUTPATIENT)
Dept: CARDIOLOGY | Facility: CLINIC | Age: 20
End: 2025-02-04
Payer: COMMERCIAL

## 2025-02-25 ENCOUNTER — TELEPHONE (OUTPATIENT)
Dept: CARDIOLOGY | Facility: CLINIC | Age: 20
End: 2025-02-25
Payer: COMMERCIAL

## 2025-02-25 NOTE — TELEPHONE ENCOUNTER
Patient needs her Metoprolol filled but its not listed in her chart, she stated you told her you were going to send it in but never did.    [facility]/pharmacy #[id number] - [place], [place] - [facility] [address] AT [facility]

## 2025-03-03 DIAGNOSIS — R00.2 PALPITATIONS: Primary | ICD-10-CM

## 2025-03-03 RX ORDER — METOPROLOL SUCCINATE 25 MG/1
25 TABLET, EXTENDED RELEASE ORAL DAILY
Qty: 90 TABLET | Refills: 3 | Status: SHIPPED | OUTPATIENT
Start: 2025-03-03 | End: 2026-03-03

## 2025-03-03 NOTE — TELEPHONE ENCOUNTER
E-Prescribing Status: Receipt confirmed by pharmacy (3/3/2025  9:15 AM EST)    Left message to patient informing her

## 2025-03-25 ENCOUNTER — APPOINTMENT (OUTPATIENT)
Dept: PRIMARY CARE | Facility: CLINIC | Age: 20
End: 2025-03-25
Payer: COMMERCIAL

## 2025-03-26 ENCOUNTER — OFFICE VISIT (OUTPATIENT)
Facility: CLINIC | Age: 20
End: 2025-03-26
Payer: COMMERCIAL

## 2025-03-26 VITALS
BODY MASS INDEX: 25.79 KG/M2 | DIASTOLIC BLOOD PRESSURE: 64 MMHG | WEIGHT: 141 LBS | HEART RATE: 103 BPM | OXYGEN SATURATION: 98 % | SYSTOLIC BLOOD PRESSURE: 100 MMHG

## 2025-03-26 DIAGNOSIS — R35.0 INCREASED FREQUENCY OF URINATION: ICD-10-CM

## 2025-03-26 DIAGNOSIS — G90.A POSTURAL ORTHOSTATIC TACHYCARDIA SYNDROME: Primary | ICD-10-CM

## 2025-03-26 DIAGNOSIS — N94.9: ICD-10-CM

## 2025-03-26 PROCEDURE — 99214 OFFICE O/P EST MOD 30 MIN: CPT | Performed by: INTERNAL MEDICINE

## 2025-03-26 ASSESSMENT — PATIENT HEALTH QUESTIONNAIRE - PHQ9
SUM OF ALL RESPONSES TO PHQ9 QUESTIONS 1 AND 2: 0
1. LITTLE INTEREST OR PLEASURE IN DOING THINGS: NOT AT ALL
2. FEELING DOWN, DEPRESSED OR HOPELESS: NOT AT ALL

## 2025-03-26 ASSESSMENT — PAIN SCALES - GENERAL: PAINLEVEL_OUTOF10: 0-NO PAIN

## 2025-03-26 ASSESSMENT — LIFESTYLE VARIABLES: TOTAL SCORE: 1

## 2025-03-26 ASSESSMENT — ENCOUNTER SYMPTOMS
OCCASIONAL FEELINGS OF UNSTEADINESS: 0
DEPRESSION: 0
LOSS OF SENSATION IN FEET: 1

## 2025-03-26 NOTE — PROGRESS NOTES
History of present illness:  This is a very pleasant 19-year-old female referred to my office for evaluation of palpitations and dizziness. Patient has been having the symptoms for the last 10 months where she feels dizzy lightheaded. Happening when she sits or stands or even laying in bed. Palpitations with fast heart rate makes her dizzy. Denies having shortness of breath with activity. Occasionally she has chest pain with activity with deep inspiration. Underwent a monitor for 2 weeks that showed 1 episode of 4 beats of nonsustained ventricular tachycardia. Otherwise no major arrhythmias except for premature ventricular contractions.  Patient has not been taking metoprolol due to concern of dropping her blood pressure that has been running low in the 90s.  Had an echocardiogram done in August 29, 2024 that showed normal ejection fraction no major valve abnormalities.    Past Medical History:   Diagnosis Date    Migraines     Other conditions influencing health status     No significant past medical history       History reviewed. No pertinent surgical history.    No Known Allergies     reports that she has never smoked. She has never been exposed to tobacco smoke. She has never used smokeless tobacco. She reports that she does not drink alcohol and does not use drugs.    No family history on file.    Patient's Medications   New Prescriptions    No medications on file   Previous Medications    DESOGESTREL-ETHINYL ESTRADIOL (APRI) 0.15-0.03 MG TABLET    TAKE 1 TABLET BY MOUTH EVERY DAY CONTINUOUS, TAKE PLACEBO WEEK EVERY 3 MONTHSS ORALLY 90 DAY(S) 84 for 84    FLUOXETINE (PROZAC) 20 MG CAPSULE    TAKE 1 CAPSULE BY MOUTH EVERY DAY    METOPROLOL SUCCINATE XL (TOPROL-XL) 25 MG 24 HR TABLET    Take 1 tablet (25 mg) by mouth once daily. Do not crush or chew.    TOPIRAMATE 100 MG CAPSULE,EXTENDED RELEASE 24HR    Take 1 capsule by mouth once daily.    XULANE 150-35 MCG/24 HR    APPLY PATCH TO SKIN, REPLACE PATCH ONCE  WEEKLY FOR 3 WEEKS, REMOVE PATCH ON WEEK 4 TRANSDERMAL AS DIRECTED 90 DAYS   Modified Medications    No medications on file   Discontinued Medications    No medications on file       Objective   Physical Exam  General: Patient in no acute distress   HEENT: Atraumatic normocephalic.  Neck: Supple, jugular venous pressure within normal limit.  No bruits  Lungs: Clear to auscultation bilaterally  Cardiovascular: Regular rate and rhythm, normal heart sounds, no murmurs rubs or gallops  Abdomen: Soft nontender nondistended.  Normal bowel sounds.  Extremities: Warm to touch, no edema.    Lab Review   No visits with results within 2 Month(s) from this visit.   Latest known visit with results is:   Lab on 11/25/2024   Component Date Value    Glucose 11/25/2024 67 (L)     Sodium 11/25/2024 141     Potassium 11/25/2024 4.2     Chloride 11/25/2024 108 (H)     Bicarbonate 11/25/2024 22     Anion Gap 11/25/2024 15     Urea Nitrogen 11/25/2024 11     Creatinine 11/25/2024 0.74     eGFR 11/25/2024 >90     Calcium 11/25/2024 9.0     Albumin 11/25/2024 4.1     Alkaline Phosphatase 11/25/2024 48     Total Protein 11/25/2024 6.8     AST 11/25/2024 16     Bilirubin, Total 11/25/2024 0.4     ALT 11/25/2024 24     Cholesterol 11/25/2024 204 (H)     HDL-Cholesterol 11/25/2024 66.8     Cholesterol/HDL Ratio 11/25/2024 3.1     LDL Calculated 11/25/2024 113 (H)     VLDL 11/25/2024 24     Triglycerides 11/25/2024 121 (H)     Non HDL Cholesterol 11/25/2024 137 (H)     T-SPOT. TB Interpretation 11/25/2024 Negative     Panel A Spot Count 11/25/2024 0     Panel B Spot Count 11/25/2024 0     NIL(NEG) Control Spot Co* 11/25/2024 Passed     POS Control Spot Count 11/25/2024 Passed         Assessment/Plan   Patient Active Problem List   Diagnosis    Contact with and (suspected) exposure to covid-19    Disorder of female genital organ    Increased frequency of urination    Mass of foot    Refractory migraine    Rib pain    Palpitations    Chest pain     Dizziness      This is a very pleasant 19-year-old female referred to my office for evaluation of palpitations and dizziness. Patient has been having the symptoms for the last 10 months where she feels dizzy lightheaded. Happening when she sits or stands or even laying in bed. Palpitations with fast heart rate makes her dizzy. Denies having shortness of breath with activity. Occasionally she has chest pain with activity with deep inspiration. Underwent a monitor for 2 weeks that showed 1 episode of 4 beats of nonsustained ventricular tachycardia. Otherwise no major arrhythmias except for premature ventricular contractions.  Patient has not been taking metoprolol due to concern of dropping her blood pressure that has been running low in the 90s.  Had an echocardiogram done in August 29, 2024 that showed normal ejection fraction no major valve abnormalities.    At this point patient symptoms could be consistent with POTS versus orthostatic hypotension.  I encouraged her to start taking metoprolol and monitor her blood pressure.  If worsening symptoms to stop.  Will consider midodrine in that case.  Will arrange for tilt table test.  Will follow-up in 3 months.      Jaquelin Obrien MD

## 2025-03-26 NOTE — LETTER
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    If you have any questions or concerns, please don't hesitate to call.         Sincerely,        Jaquelin Obrien MD    CC: No Recipients

## 2025-03-27 ENCOUNTER — TELEPHONE (OUTPATIENT)
Dept: CARDIOLOGY | Facility: CLINIC | Age: 20
End: 2025-03-27
Payer: COMMERCIAL

## 2025-03-27 NOTE — TELEPHONE ENCOUNTER
Patient called and left a vm stating she was told to call you about scheduling a tilt table test. Can you please call the patient back. I left her a voicemail letting her know you were out of the office and would return her when you are able to make the appointment.

## 2025-03-31 NOTE — TELEPHONE ENCOUNTER
4/4/2025 Status: [facility]   Time: 9:15 AM  9:15 AM    Left message to patient informing her scheduled no pa required reference#[id number]

## 2025-04-01 RX ORDER — SODIUM CHLORIDE 9 MG/ML
100 INJECTION, SOLUTION INTRAVENOUS CONTINUOUS
OUTPATIENT
Start: 2025-04-01 | End: 2025-04-01

## 2025-04-02 ENCOUNTER — APPOINTMENT (OUTPATIENT)
Dept: CARDIOLOGY | Facility: HOSPITAL | Age: 20
End: 2025-04-02
Payer: COMMERCIAL

## 2025-04-03 NOTE — NURSING NOTE
Left voicemail on telephone number provided in the chart along with preprocedure instructions and number to call with any questions.

## 2025-04-04 ENCOUNTER — APPOINTMENT (OUTPATIENT)
Dept: CARDIOLOGY | Facility: HOSPITAL | Age: 20
End: 2025-04-04
Payer: COMMERCIAL

## 2025-05-08 DIAGNOSIS — Z30.09 ENCOUNTER FOR OTHER GENERAL COUNSELING AND ADVICE ON CONTRACEPTION: ICD-10-CM

## 2025-05-09 RX ORDER — NORELGESTROMIN AND ETHINYL ESTRADIOL 35; 150 UG/MG; UG/MG
PATCH TRANSDERMAL
Qty: 3 PATCH | Refills: 0 | Status: SHIPPED | OUTPATIENT
Start: 2025-05-09

## 2025-06-10 ENCOUNTER — OFFICE VISIT (OUTPATIENT)
Dept: OBSTETRICS AND GYNECOLOGY | Facility: CLINIC | Age: 20
End: 2025-06-10
Payer: COMMERCIAL

## 2025-06-10 VITALS
WEIGHT: 126.2 LBS | HEIGHT: 62 IN | SYSTOLIC BLOOD PRESSURE: 115 MMHG | BODY MASS INDEX: 23.22 KG/M2 | DIASTOLIC BLOOD PRESSURE: 80 MMHG

## 2025-06-10 DIAGNOSIS — Z11.3 SCREEN FOR STD (SEXUALLY TRANSMITTED DISEASE): ICD-10-CM

## 2025-06-10 DIAGNOSIS — Z30.011 BCP (BIRTH CONTROL PILLS) INITIATION: ICD-10-CM

## 2025-06-10 DIAGNOSIS — Z01.419 ENCOUNTER FOR ANNUAL ROUTINE GYNECOLOGICAL EXAMINATION: Primary | ICD-10-CM

## 2025-06-10 PROCEDURE — 87661 TRICHOMONAS VAGINALIS AMPLIF: CPT

## 2025-06-10 PROCEDURE — 99395 PREV VISIT EST AGE 18-39: CPT

## 2025-06-10 PROCEDURE — 1036F TOBACCO NON-USER: CPT

## 2025-06-10 PROCEDURE — 87491 CHLMYD TRACH DNA AMP PROBE: CPT

## 2025-06-10 PROCEDURE — 3008F BODY MASS INDEX DOCD: CPT

## 2025-06-10 RX ORDER — DROSPIRENONE 4 MG/1
4 TABLET, FILM COATED ORAL DAILY
Qty: 90 TABLET | Refills: 3 | Status: SHIPPED | OUTPATIENT
Start: 2025-06-10 | End: 2026-06-10

## 2025-06-10 ASSESSMENT — ENCOUNTER SYMPTOMS
NAUSEA: 0
DIZZINESS: 0
UNEXPECTED WEIGHT CHANGE: 0
COLOR CHANGE: 0
VOMITING: 0
CHILLS: 0
COUGH: 0
LOSS OF SENSATION IN FEET: 0
OCCASIONAL FEELINGS OF UNSTEADINESS: 0
HEADACHES: 0
DEPRESSION: 0
DYSURIA: 0
FEVER: 0
FATIGUE: 0
ABDOMINAL PAIN: 0
SHORTNESS OF BREATH: 0

## 2025-06-10 ASSESSMENT — PATIENT HEALTH QUESTIONNAIRE - PHQ9
2. FEELING DOWN, DEPRESSED OR HOPELESS: NOT AT ALL
1. LITTLE INTEREST OR PLEASURE IN DOING THINGS: NOT AT ALL
SUM OF ALL RESPONSES TO PHQ9 QUESTIONS 1 & 2: 0

## 2025-06-10 ASSESSMENT — PAIN SCALES - GENERAL: PAINLEVEL_OUTOF10: 0-NO PAIN

## 2025-06-10 NOTE — PROGRESS NOTES
"Subjective   Miroslava Bryant is a 19 y.o. female who is here for a routine GYN exam. Last saw her 2023.    -Regular bleeding patterns on BC patch; however she does report worsening migraines, and now with auras, since being on the patch; has experienced migraines in the past prior to patch use but never with auras; occurring more frequently; usually whenever she replaces the patch or has a week off of the patch.     -Denies breast or vaginal concerns today.     Complaints:   migraines with auras  Periods: regular   Dysmenorrhea:  none    Current contraception: patch - switching to POP  History of abnormal Pap smear: no  History of abnormal mammogram: no      OB History          0    Para   0    Term   0       0    AB   0    Living   0         SAB   0    IAB   0    Ectopic   0    Multiple   0    Live Births   0                  Review of Systems   Constitutional:  Negative for chills, fatigue, fever and unexpected weight change.   Respiratory:  Negative for cough and shortness of breath.    Gastrointestinal:  Negative for abdominal pain, nausea and vomiting.   Genitourinary:  Negative for dyspareunia, dysuria, pelvic pain and vaginal discharge.   Skin:  Negative for color change and rash.   Neurological:  Negative for dizziness and headaches.        + migraines with auras       Objective   /80   Ht 1.575 m (5' 2\")   Wt 57.2 kg (126 lb 3.2 oz)   LMP 2025 (Exact Date)   BMI 23.08 kg/m²        General:   Alert and oriented, in no acute distress   Neck: Supple. No visible thyromegaly.    Breast/Axilla: Normal to palpation bilaterally without masses, skin changes, or nipple discharge.    Abdomen: Soft, non-tender, without masses or organomegaly   Vulva: Normal architecture without erythema, masses, or lesions.    Vagina: Normal mucosa without lesions, masses, or atrophy. No abnormal vaginal discharge.    Cervix: Normal without masses, lesions, or signs of cervicitis; pap performed  "   Uterus: Normal, mobile, non-enlarged uterus   Adnexa: Normal without masses or lesions   Pelvic Floor Normal    Psych Normal affect. Normal mood.      Assessment/Plan   Diagnoses and all orders for this visit:  Encounter for annual routine gynecological examination  -     THINPREP PAP TEST  Screen for STD (sexually transmitted disease)  -     THINPREP PAP TEST  BCP (birth control pills) initiation  -     drospirenone, contraceptive, (Slynd) 4 mg (28) tablet; Take 1 tablet by mouth once daily.  - With notable hx of migraines with auras, will switch to a POP to mitigate any risks associated btwn EE use/migraines.  - Reviewed how to begin pill pack and daily dosing. She will call the office if she is experiencing any issues with OCP.     Judi Cheema PA-C

## 2025-06-12 LAB
C TRACH RRNA SPEC QL NAA+PROBE: NEGATIVE
N GONORRHOEA DNA SPEC QL PROBE+SIG AMP: NEGATIVE
T VAGINALIS RRNA SPEC QL NAA+PROBE: NEGATIVE

## 2025-07-02 LAB
CYTOLOGY CMNT CVX/VAG CYTO-IMP: NORMAL
LAB AP HPV GENOTYPE QUESTION: YES
LAB AP HPV HR: NORMAL
LAB AP PAP ADDITIONAL TESTS: NORMAL
LABORATORY COMMENT REPORT: NORMAL
LMP START DATE: NORMAL
PATH REPORT.TOTAL CANCER: NORMAL

## 2025-07-17 ENCOUNTER — PATIENT MESSAGE (OUTPATIENT)
Facility: CLINIC | Age: 20
End: 2025-07-17
Payer: COMMERCIAL

## 2025-07-23 ENCOUNTER — PATIENT MESSAGE (OUTPATIENT)
Dept: OBSTETRICS AND GYNECOLOGY | Facility: CLINIC | Age: 20
End: 2025-07-23
Payer: COMMERCIAL

## 2025-07-24 NOTE — TELEPHONE ENCOUNTER
Pt calling wanting to know if it is normal not to get menses on placebo week when a new birth control  / advised this can be normal / pt was to start new pill nohemi 5 days ago pt wanting to know if it would be ok to start new nohemi now / pt states that she has not been sexually active / pt states she did do pregnancy test x 4 3 were neg and 4th may of have a faint line  / Advised best to wait to hear from K[facility] / advised KB out of the office until Monday  / advised to use back up method / pt states that she does not plan to be sexually ative until hearing for kb

## 2025-08-29 ENCOUNTER — APPOINTMENT (OUTPATIENT)
Dept: RADIOLOGY | Facility: CLINIC | Age: 20
End: 2025-08-29
Payer: COMMERCIAL